# Patient Record
Sex: FEMALE | Race: WHITE | NOT HISPANIC OR LATINO | URBAN - METROPOLITAN AREA
[De-identification: names, ages, dates, MRNs, and addresses within clinical notes are randomized per-mention and may not be internally consistent; named-entity substitution may affect disease eponyms.]

---

## 2024-11-20 ENCOUNTER — LAB (OUTPATIENT)
Dept: LAB | Facility: LAB | Age: 64
End: 2024-11-20

## 2024-11-20 DIAGNOSIS — M81.0 AGE-RELATED OSTEOPOROSIS WITHOUT CURRENT PATHOLOGICAL FRACTURE: Primary | ICD-10-CM

## 2024-11-20 DIAGNOSIS — E55.9 VITAMIN D DEFICIENCY, UNSPECIFIED: ICD-10-CM

## 2024-11-20 LAB — 25(OH)D3 SERPL-MCNC: 70 NG/ML (ref 30–100)

## 2024-11-20 PROCEDURE — 82306 VITAMIN D 25 HYDROXY: CPT

## 2025-03-13 NOTE — PROGRESS NOTES
Subjective   Patient ID: Emilia Pickard is a 64 y.o. female who presents for No chief complaint on file..  HPI  This 64-year-old male last seen in the office in 2022 is being seen in follow-up.  He has been seen in the past for chronic mucus hypersecretion in his respiratory tract along with at times eustachian tube difficulties.  Environmental and seasonal allergies have been noted in her background.  Symptoms generally seem to worsen towards the spring of the year.  Middle ear effusions have been treated in the past with prednisone she has been treated in the past with Zyrtec and Flonase nasal saline washes had been recommended on a routine basis to help improve nasal function.  Previously she had been advised to consider the use of Astepro as a topical antihistamine spray as opposed to oral antihistamines.  She presents today with increasing symptoms suggesting allergic reactions.  Review of Systems   A 12 point ROS  has been reviewed and are negative for complaint except for what is stated in the history of present illness and /or for past medical history as noted in the EMR.    Past Medical History:   Diagnosis Date    Allergy status to unspecified drugs, medicaments and biological substances     History of seasonal allergies    Disease of stomach and duodenum, unspecified     Stomach problems    Other allergy status, other than to drugs and biological substances     Environmental allergies    Personal history of other diseases of the musculoskeletal system and connective tissue     History of osteoporosis        No current outpatient medications on file.     Social History     Tobacco Use    Smoking status: Not on file    Smokeless tobacco: Not on file   Substance Use Topics    Alcohol use: Not on file       Not on File    There were no vitals taken for this visit.    Objective   Physical Exam  EXAMINATION:     GENERAL KYLE.EARANCE: Alert, in no acute distress, normal pitch and clarity of voice, well-developed  and nourished, cooperative.     HEAD/FACE: Normocephalic, atraumatic, normal facial movements and strength, no no tenderness to palpation, no lesions noted.     SKIN: Normal turgor, no raised or ulcerative lesions, warm and dry to palpation.     EYES: Extraocular motions intact, no nystagmus noted, pupils equal and reactive to light and accommodation, no conjunctivitis.     EARS: Both ears--external ear anatomy is normal without lesions, auditory canals are patent and without skin abrasions or lesions, hearing is intact to voice, tympanic membranes are intact with no acute inflammation, light reflexes present, no effusions are noted and no mastoid tenderness found to palpation. AC>BC @ 256 hz and Nelson was midline     NOSE: No external skin lesions are noted, nares are patent, septum is intact, sinuses are nontender to palpation bilaterally, no intranasal lesions or inflammation is noted, nasal valve is normal.     OROPHARYNX/ORAL CAVITY: Oropharynx is not inflamed and is without lesions, mucosa of the oral cavity is intact and without lesions, tongue is midline and mobile, no acute dental disease is noted, TMJs are mobile     NECK: No lymphadenopathy is palpated, carotid pulses are intact, neck is supple with full range of motion, no thyroid abnormalities are noted, trachea is midline, no neck masses are palpated.     LYMPHATICS: No cervical adenopathy or supraclavicular adenopathy is palpated.     NEUROLOGIC/PSYCH; alert and oriented, cranial nerves are grossly intact, gait is without falling, no motor deficits are noted.   Assessment/Plan   {Assess/PlanSmartLinks:[id number]}         Darwin Abraham DMD, MD 03/13/25 12:39 PM    Darwin Abraham DMD, MD    Consent:     Consent obtained:  Verbal    Consent given by:  Patient    Risks discussed:  Pain and incomplete removal    Alternatives discussed:  No treatment and alternative treatment  Universal protocol:     Procedure explained and questions answered to patient or proxy's satisfaction: yes      Imaging studies available: no      Required blood products, implants, devices, and special equipment available: no      Patient identity confirmed:  Verbally with patient  Procedure details:     Location:  L ear and R ear    Procedure type: curette      Procedure type comment:  Or suction    Procedure outcomes: cerumen removed    Post-procedure details:     Inspection:  No bleeding and ear canal clear    Hearing quality:  Improved    Procedure completion:  Tolerated well, no immediate complications    Assessment/Plan   Problem List Items Addressed This Visit    None  Visit Diagnoses         Codes    Chronic mucus hypersecretion, respiratory    -  Primary J39.8    Environmental allergies     Z91.09    Bilateral impacted cerumen     H61.23    Dysfunction of left eustachian tube     H69.92          I discussed the clinical finds with the patient.  Her exam today was negative for any signs of infection or inflammation.  I could not visually see any fluid on the left ear or retraction.  I recommend that she try Astepro as a nasal antihistamine spray using it at a different time than the Flonase.  I also encouraged her to try the nasal saline washes again which she has been resistant to try.  Misting her nose with saline might be of benefit as well throughout the the day.  Another antihistamine that she could try separately would be Xyzal.  She does not need to take both Astepro and Xyzal together but can see if 1 works better than the other especially as she has towards the allergy season.  If she continues to have difficulties with her ear she should make arrangements for a visit with an  audiogram.       Darwin Abraham DMD, MD 03/17/25 1:26 PM

## 2025-03-17 ENCOUNTER — APPOINTMENT (OUTPATIENT)
Dept: OTOLARYNGOLOGY | Facility: CLINIC | Age: 65
End: 2025-03-17
Payer: COMMERCIAL

## 2025-03-17 VITALS
WEIGHT: 106 LBS | SYSTOLIC BLOOD PRESSURE: 120 MMHG | BODY MASS INDEX: 18.78 KG/M2 | DIASTOLIC BLOOD PRESSURE: 73 MMHG | HEART RATE: 65 BPM

## 2025-03-17 DIAGNOSIS — H69.92 DYSFUNCTION OF LEFT EUSTACHIAN TUBE: ICD-10-CM

## 2025-03-17 DIAGNOSIS — H61.23 BILATERAL IMPACTED CERUMEN: ICD-10-CM

## 2025-03-17 DIAGNOSIS — Z91.09 ENVIRONMENTAL ALLERGIES: ICD-10-CM

## 2025-03-17 DIAGNOSIS — J39.8 CHRONIC MUCUS HYPERSECRETION, RESPIRATORY: Primary | ICD-10-CM

## 2025-03-17 PROCEDURE — 1160F RVW MEDS BY RX/DR IN RCRD: CPT | Performed by: OTOLARYNGOLOGY

## 2025-03-17 PROCEDURE — 99213 OFFICE O/P EST LOW 20 MIN: CPT | Performed by: OTOLARYNGOLOGY

## 2025-03-17 PROCEDURE — 69210 REMOVE IMPACTED EAR WAX UNI: CPT | Performed by: OTOLARYNGOLOGY

## 2025-03-17 PROCEDURE — 1036F TOBACCO NON-USER: CPT | Performed by: OTOLARYNGOLOGY

## 2025-03-17 PROCEDURE — 1159F MED LIST DOCD IN RCRD: CPT | Performed by: OTOLARYNGOLOGY
